# Patient Record
Sex: MALE | Race: WHITE | NOT HISPANIC OR LATINO | URBAN - METROPOLITAN AREA
[De-identification: names, ages, dates, MRNs, and addresses within clinical notes are randomized per-mention and may not be internally consistent; named-entity substitution may affect disease eponyms.]

---

## 2022-07-09 ENCOUNTER — TELEPHONE ENCOUNTER (OUTPATIENT)
Dept: URBAN - METROPOLITAN AREA CLINIC 121 | Facility: CLINIC | Age: 67
End: 2022-07-09

## 2022-07-09 RX ORDER — ASPIRIN 81 MG/1
TABLET, CHEWABLE ORAL
Refills: 0 | OUTPATIENT
Start: 2017-03-06 | End: 2017-11-17

## 2022-07-09 RX ORDER — ASPIRIN 81 MG/1
TABLET, CHEWABLE ORAL
Refills: 0 | OUTPATIENT
Start: 2016-10-25 | End: 2017-03-06

## 2022-07-09 RX ORDER — OMEGA-3S/DHA/EPA/FISH OIL 980-1400MG
CAPSULE,DELAYED RELEASE (ENTERIC COATED) ORAL
Refills: 0 | OUTPATIENT
Start: 2016-10-25 | End: 2017-03-06

## 2022-07-09 RX ORDER — SIMVASTATIN 20 MG/1
TABLET, FILM COATED ORAL
Refills: 0 | OUTPATIENT
Start: 2016-10-07 | End: 2017-03-06

## 2022-07-09 RX ORDER — OMEGA-3S/DHA/EPA/FISH OIL 980-1400MG
CAPSULE,DELAYED RELEASE (ENTERIC COATED) ORAL
Refills: 0 | OUTPATIENT
Start: 2017-03-06 | End: 2017-11-17

## 2022-07-09 RX ORDER — AMLODIPINE BESYLATE 10 MG/1
TABLET ORAL
Refills: 0 | OUTPATIENT
Start: 2016-10-07 | End: 2017-03-06

## 2022-07-10 ENCOUNTER — TELEPHONE ENCOUNTER (OUTPATIENT)
Dept: URBAN - METROPOLITAN AREA CLINIC 121 | Facility: CLINIC | Age: 67
End: 2022-07-10

## 2022-07-10 RX ORDER — ASPIRIN 81 MG/1
TABLET, CHEWABLE ORAL
Refills: 0 | Status: ACTIVE | COMMUNITY
Start: 2017-11-17

## 2022-07-10 RX ORDER — SIMVASTATIN 20 MG/1
TABLET, FILM COATED ORAL
Refills: 0 | Status: ACTIVE | COMMUNITY
Start: 2017-03-06

## 2022-07-10 RX ORDER — OMEGA-3S/DHA/EPA/FISH OIL 980-1400MG
CAPSULE,DELAYED RELEASE (ENTERIC COATED) ORAL
Refills: 0 | Status: ACTIVE | COMMUNITY
Start: 2017-11-17

## 2022-07-10 RX ORDER — AMLODIPINE BESYLATE 10 MG/1
TABLET ORAL
Refills: 0 | Status: ACTIVE | COMMUNITY
Start: 2017-03-06

## 2023-04-17 ENCOUNTER — P2P PATIENT RECORD (OUTPATIENT)
Age: 68
End: 2023-04-17

## 2023-04-18 ENCOUNTER — TELEPHONE ENCOUNTER (OUTPATIENT)
Dept: URBAN - METROPOLITAN AREA CLINIC 60 | Facility: CLINIC | Age: 68
End: 2023-04-18

## 2023-07-05 PROBLEM — 43783005: Status: ACTIVE | Noted: 2023-07-05

## 2023-07-05 PROBLEM — 197321007: Status: ACTIVE | Noted: 2023-07-05

## 2023-07-05 PROBLEM — 428283002: Status: ACTIVE | Noted: 2023-07-05

## 2023-07-06 ENCOUNTER — WEB ENCOUNTER (OUTPATIENT)
Dept: URBAN - METROPOLITAN AREA CLINIC 63 | Facility: CLINIC | Age: 68
End: 2023-07-06

## 2023-07-06 ENCOUNTER — OFFICE VISIT (OUTPATIENT)
Dept: URBAN - METROPOLITAN AREA CLINIC 63 | Facility: CLINIC | Age: 68
End: 2023-07-06
Payer: COMMERCIAL

## 2023-07-06 VITALS
BODY MASS INDEX: 33.66 KG/M2 | OXYGEN SATURATION: 98 % | WEIGHT: 254 LBS | HEIGHT: 73 IN | HEART RATE: 98 BPM | TEMPERATURE: 97.4 F | SYSTOLIC BLOOD PRESSURE: 180 MMHG | DIASTOLIC BLOOD PRESSURE: 100 MMHG

## 2023-07-06 DIAGNOSIS — Z78.9 MODERATE ALCOHOL CONSUMPTION: ICD-10-CM

## 2023-07-06 DIAGNOSIS — Z86.010 PERSONAL HISTORY OF COLONIC POLYPS: ICD-10-CM

## 2023-07-06 DIAGNOSIS — K76.0 FATTY LIVER: ICD-10-CM

## 2023-07-06 PROCEDURE — 99204 OFFICE O/P NEW MOD 45 MIN: CPT | Performed by: INTERNAL MEDICINE

## 2023-07-06 RX ORDER — LATANOPROST 50 UG/ML
SOLUTION OPHTHALMIC
Qty: 5 MILLILITER | Status: ACTIVE | COMMUNITY

## 2023-07-06 RX ORDER — OMEGA-3S/DHA/EPA/FISH OIL 980-1400MG
CAPSULE,DELAYED RELEASE (ENTERIC COATED) ORAL
Refills: 0 | Status: ACTIVE | COMMUNITY
Start: 2017-11-17

## 2023-07-06 RX ORDER — ASPIRIN 81 MG/1
TABLET, CHEWABLE ORAL
Refills: 0 | Status: ACTIVE | COMMUNITY
Start: 2017-11-17

## 2023-07-06 RX ORDER — CLOPIDOGREL 75 MG/1
1 TABLET TABLET, FILM COATED ORAL ONCE A DAY
Qty: 30 | Status: ACTIVE | COMMUNITY
Start: 2023-07-06

## 2023-07-06 RX ORDER — SIMVASTATIN 20 MG/1
TABLET, FILM COATED ORAL
Refills: 0 | Status: ACTIVE | COMMUNITY
Start: 2017-03-06

## 2023-07-06 RX ORDER — AMLODIPINE BESYLATE 10 MG/1
TABLET ORAL
Refills: 0 | Status: ACTIVE | COMMUNITY
Start: 2017-03-06

## 2023-07-06 NOTE — HPI-PREVIOUS PROCEDURES
Colonoscopy 2017: Good prep, normal terminal ileum, pancolonic diverticulosis, 5 mm hyperplastic rectal polyp, grade 2 hemorrhoids Colonoscopy 2011: Descending colon polyps-adenoma/hyperplastic (5 to 10 mm in size), hyperplastic rectal polyps.

## 2023-07-06 NOTE — HPI-PREVIOUS IMAGING
Ultrasound November 2017: Heterogeneous liver echotexture with mildly nodular contour, left hepatic lobe cyst, right hepatic lobe cyst, normal CBD, normal gallbladder normal spleen

## 2023-07-06 NOTE — HPI-TODAY'S VISIT:
Manpreet is a pleasant 68-year-old male with history of moderate alcohol use and fatty liver disease and evaluation for the need for a colonoscopy.  He is doing extremely well from a GI standpoint.  Reviewed his last colonoscopy report from 2017 when he had a small hyperplastic rectal polyp.  He may not require a colonoscopy till 2027. He is asymptomatic.  Reports regular bowel movements and denies any rectal bleeding.  Reports no reflux symptoms or dysphagia. Regarding his alcohol intake he reports he does not drink during the week but on Friday Saturday Sunday approximately has 4 alcoholic beverages per day. He exercises on a regular basis.-Rides his bike, swims and does yard work. He is currently off Plavix awaiting a prostate biopsy after his PSA came by elevated and his MRI pelvis noted suspicious nodules in his prostate..

## 2023-07-13 ENCOUNTER — LAB OUTSIDE AN ENCOUNTER (OUTPATIENT)
Dept: URBAN - METROPOLITAN AREA CLINIC 63 | Facility: CLINIC | Age: 68
End: 2023-07-13

## 2023-07-17 LAB
A/G RATIO: 2
ABSOLUTE BASOPHILS: 28
ABSOLUTE EOSINOPHILS: 169
ABSOLUTE LYMPHOCYTES: 2012
ABSOLUTE MONOCYTES: 606
ABSOLUTE NEUTROPHILS: 1885
AFP, SERUM, TUMOR MARKER: 3.5
ALBUMIN: 4.5
ALKALINE PHOSPHATASE: 44
ALT (SGPT): 46
AST (SGOT): 33
BASOPHILS: 0.6
BILIRUBIN, TOTAL: 2
BUN/CREATININE RATIO: (no result)
BUN: 18
CALCIUM: 9
CARBON DIOXIDE, TOTAL: 24
CHLORIDE: 102
CREATININE: 0.88
EGFR: 95
EOSINOPHILS: 3.6
GLOBULIN, TOTAL: 2.2
GLUCOSE: 94
HEMATOCRIT: 48
HEMOGLOBIN: 15.6
INR: 1
LYMPHOCYTES: 42.8
MCH: 30.2
MCHC: 32.5
MCV: 93
MONOCYTES: 12.9
MPV: 9.9
NEUTROPHILS: 40.1
PLATELET COUNT: 259
POTASSIUM: 4
PROTEIN, TOTAL: 6.7
PT: 10.4
RDW: 12.6
RED BLOOD CELL COUNT: 5.16
SODIUM: 135
WHITE BLOOD CELL COUNT: 4.7

## 2023-09-25 ENCOUNTER — APPOINTMENT (RX ONLY)
Dept: URBAN - METROPOLITAN AREA CLINIC 333 | Facility: CLINIC | Age: 68
Setting detail: DERMATOLOGY
End: 2023-09-25

## 2023-09-25 DIAGNOSIS — D22 MELANOCYTIC NEVI: ICD-10-CM

## 2023-09-25 DIAGNOSIS — L57.8 OTHER SKIN CHANGES DUE TO CHRONIC EXPOSURE TO NONIONIZING RADIATION: ICD-10-CM

## 2023-09-25 PROBLEM — D22.5 MELANOCYTIC NEVI OF TRUNK: Status: ACTIVE | Noted: 2023-09-25

## 2023-09-25 PROCEDURE — 99203 OFFICE O/P NEW LOW 30 MIN: CPT

## 2023-09-25 PROCEDURE — ? TREATMENT REGIMEN

## 2023-09-25 PROCEDURE — ? COUNSELING

## 2023-09-25 ASSESSMENT — LOCATION DETAILED DESCRIPTION DERM
LOCATION DETAILED: LEFT DISTAL DORSAL FOREARM
LOCATION DETAILED: RIGHT SUPERIOR MEDIAL UPPER BACK
LOCATION DETAILED: RIGHT DISTAL DORSAL FOREARM
LOCATION DETAILED: UPPER STERNUM
LOCATION DETAILED: RIGHT INFERIOR MEDIAL FOREHEAD

## 2023-09-25 ASSESSMENT — LOCATION SIMPLE DESCRIPTION DERM
LOCATION SIMPLE: RIGHT UPPER BACK
LOCATION SIMPLE: RIGHT FOREARM
LOCATION SIMPLE: RIGHT FOREHEAD
LOCATION SIMPLE: CHEST
LOCATION SIMPLE: LEFT FOREARM

## 2023-09-25 ASSESSMENT — LOCATION ZONE DERM
LOCATION ZONE: TRUNK
LOCATION ZONE: FACE
LOCATION ZONE: ARM

## 2023-10-26 ENCOUNTER — DASHBOARD ENCOUNTERS (OUTPATIENT)
Age: 68
End: 2023-10-26

## 2023-10-26 ENCOUNTER — OFFICE VISIT (OUTPATIENT)
Dept: URBAN - METROPOLITAN AREA CLINIC 63 | Facility: CLINIC | Age: 68
End: 2023-10-26
Payer: COMMERCIAL

## 2023-10-26 ENCOUNTER — OFFICE VISIT (OUTPATIENT)
Dept: URBAN - METROPOLITAN AREA CLINIC 63 | Facility: CLINIC | Age: 68
End: 2023-10-26

## 2023-10-26 VITALS
OXYGEN SATURATION: 98 % | DIASTOLIC BLOOD PRESSURE: 80 MMHG | WEIGHT: 254.2 LBS | HEIGHT: 73 IN | BODY MASS INDEX: 33.69 KG/M2 | SYSTOLIC BLOOD PRESSURE: 130 MMHG | TEMPERATURE: 97.3 F | HEART RATE: 78 BPM

## 2023-10-26 DIAGNOSIS — Z78.9 MODERATE ALCOHOL CONSUMPTION: ICD-10-CM

## 2023-10-26 DIAGNOSIS — Z86.010 PERSONAL HISTORY OF COLONIC POLYPS: ICD-10-CM

## 2023-10-26 DIAGNOSIS — K76.0 FATTY LIVER: ICD-10-CM

## 2023-10-26 PROCEDURE — 99214 OFFICE O/P EST MOD 30 MIN: CPT | Performed by: INTERNAL MEDICINE

## 2023-10-26 RX ORDER — CLOPIDOGREL 75 MG/1
1 TABLET TABLET, FILM COATED ORAL ONCE A DAY
Qty: 30 | Status: ACTIVE | COMMUNITY
Start: 2023-07-06

## 2023-10-26 RX ORDER — SIMVASTATIN 20 MG/1
TABLET, FILM COATED ORAL
Refills: 0 | Status: ACTIVE | COMMUNITY
Start: 2017-03-06

## 2023-10-26 RX ORDER — ASPIRIN 81 MG/1
TABLET, CHEWABLE ORAL
Refills: 0 | Status: ACTIVE | COMMUNITY
Start: 2017-11-17

## 2023-10-26 RX ORDER — AMLODIPINE BESYLATE 10 MG/1
TABLET ORAL
Refills: 0 | Status: ACTIVE | COMMUNITY
Start: 2017-03-06

## 2023-10-26 RX ORDER — LATANOPROST 50 UG/ML
SOLUTION OPHTHALMIC
Qty: 5 MILLILITER | Status: ACTIVE | COMMUNITY

## 2023-10-26 NOTE — HPI-TODAY'S VISIT:
Manpreet is a pleasant 68-year-old male with history of moderate alcohol use and fatty liver disease .  Manpreet is here today in follow-up. He seems to be doing well from a GI standpoint. He has cut down his alcohol intake. Drinks a glass of wine twice a week. He is back on Plavix. Exercises on a regular basis-walks and has joined a gym. Unfortunately his prostate biopsies came back showing malignancy and he has undergone multiple radiation treatments which he will finish in December. He will also get hormone shots. He wants to hold off on his colonoscopy till spring next year. He has had a CT abdomen as well as a bone scan for staging his prostate cancer and they came back unremarkable. Reports no change in bowel habits rectal bleeding or unintentional weight loss loss of appetite. Denies any reflux or dysphagia..  Reviewed his last colonoscopy report from 2017 when he had a small hyperplastic rectal polyp.  He may not require a colonoscopy till 2027.

## 2023-10-26 NOTE — HPI-PREVIOUS IMAGING
CAT scan abdomen with and without contrast July 2023: Large liver fatty infiltration, patent portal and hepatic veins. 1 cm left hepatic lobe cyst. 16 mm hemangioma in segment 7. Focal area of fat sparing in the inferior right lobe. No signs of cirrhosis. Normal biliary tree. Normal spleen. Normal gallbladder. Normal upper GI tract and small and large bowel. CAT scan abdomen with and without contrast July 2023: Mild stranding of the fat at the root of the mesentery without adenopathy..  Ultrasound November 2017: Heterogeneous liver echotexture with mildly nodular contour, left hepatic lobe cyst, right hepatic lobe cyst, normal CBD, normal gallbladder normal spleen

## 2024-08-13 ENCOUNTER — OFFICE VISIT (OUTPATIENT)
Dept: URBAN - METROPOLITAN AREA CLINIC 60 | Facility: CLINIC | Age: 69
End: 2024-08-13
Payer: COMMERCIAL

## 2024-08-13 VITALS
TEMPERATURE: 98.7 F | BODY MASS INDEX: 33.29 KG/M2 | RESPIRATION RATE: 12 BRPM | WEIGHT: 251.2 LBS | SYSTOLIC BLOOD PRESSURE: 152 MMHG | OXYGEN SATURATION: 98 % | DIASTOLIC BLOOD PRESSURE: 80 MMHG | HEIGHT: 73 IN | HEART RATE: 90 BPM

## 2024-08-13 DIAGNOSIS — K76.0 FATTY LIVER: ICD-10-CM

## 2024-08-13 DIAGNOSIS — Z78.9 MODERATE ALCOHOL CONSUMPTION: ICD-10-CM

## 2024-08-13 DIAGNOSIS — Z86.010 PERSONAL HISTORY OF COLONIC POLYPS: ICD-10-CM

## 2024-08-13 PROCEDURE — 99213 OFFICE O/P EST LOW 20 MIN: CPT | Performed by: INTERNAL MEDICINE

## 2024-08-13 RX ORDER — ONDANSETRON HYDROCHLORIDE 4 MG/1
1 TABLET TABLET, FILM COATED ORAL
Qty: 2 | Refills: 0 | OUTPATIENT
Start: 2024-08-13

## 2024-08-13 RX ORDER — SIMVASTATIN 20 MG/1
TAKE 1 TABLET BY MOUTH EVERY DAY TABLET, FILM COATED ORAL
Qty: 60 EACH | Refills: 0 | Status: ACTIVE | COMMUNITY

## 2024-08-13 RX ORDER — CLOPIDOGREL BISULFATE 75 MG/1
TABLET, FILM COATED ORAL
Qty: 30 TABLET | Status: ACTIVE | COMMUNITY

## 2024-08-13 RX ORDER — LATANOPROST 50 UG/ML
INSTILL 1 DROP IN RIGHT EYE EVERY EVENING SOLUTION OPHTHALMIC
Qty: 2.5 MILLILITER | Refills: 1 | Status: ACTIVE | COMMUNITY

## 2024-08-13 RX ORDER — POLYETHYLENE GLYCOL 3350, SODIUM CHLORIDE, SODIUM BICARBONATE, POTASSIUM CHLORIDE 420; 11.2; 5.72; 1.48 G/4L; G/4L; G/4L; G/4L
AS DIRECTED POWDER, FOR SOLUTION ORAL ONCE
Qty: 4000 | Refills: 0 | OUTPATIENT
Start: 2024-08-13 | End: 2024-08-14

## 2024-08-13 RX ORDER — AMLODIPINE BESYLATE 10 MG/1
TAKE 1 TABLET BY MOUTH EVERY DAY TABLET ORAL
Qty: 60 EACH | Refills: 0 | Status: ACTIVE | COMMUNITY

## 2024-08-13 NOTE — HPI-PREVIOUS LABS
Labs April 2024: Normal renal function, normal electrolytes, serum albumin 4.4, normal liver enzymes, total bilirubin 1.5, Triglycerides 198 (H), normal rest of the lipid panel, WBC 3.01, hemoglobin 14.4 g, MCV 91, platelets 226  \ Serological work-up negative Previous history of elevated serum iron with normal iron saturation.

## 2024-08-13 NOTE — PHYSICAL EXAM GASTROINTESTINAL
soft, nontender, nondistended , no masses palpable , normal bowel sounds , Liver margin felt 3 cm below rcm

## 2024-08-13 NOTE — PHYSICAL EXAM EYES:
Conjuntivae and eyelids appear normal , Sclerae : White without injection , no scalp lesions Rapid SARS-COV-2 by PCR Not Detected / Detected / Presumptive Positive / Inhibitors present Detected     Parent informed that pts COVID testing was positive.  They should expect a phone call from the Public [facility].    They need to remain on strict home self isolation. It would be appropriate to return to work/school    *once they have been fever free for least 24 hours without the use of fever reducing medications,    *improvement of their symptoms   *and at least 5 days have passed since the symptoms began.      Unless otherwise directed by [facility].     For any worsening symptoms, they need to be evaluated in the Emergency Department, not the Urgent Care

## 2024-08-13 NOTE — HPI-TODAY'S VISIT:
Manpreet is here today in follow-up. He completed 45 sessions of radiation for his prostate cancer last fall. He is on hormonal supplementation. He reports that he drinks 2 beers and 2 glasses of wine over the weekends on each day. He denies any other symptoms of reflux dysphagia early satiety bloating abdominal pain or nausea. Denies any constipation diarrhea rectal bleeding or melena. He has a  and works out 3 days a week. He is on Plavix for his total global amnesia that is being prescribed by his primary care doctor Dr. Flores. He is also on 81 mg aspirin. He is due for his colonoscopy. He has a history of fatty liver disease and his risk factors being overweight, hypertriglyceridemia and mild to moderate alcohol use  Reviewed his last colonoscopy report from 2017 when he had a small hyperplastic rectal polyp.

## 2024-08-13 NOTE — HPI-PREVIOUS IMAGING
CAT scan abdomen with and without contrast July 2023: Large liver fatty infiltration, patent portal and hepatic veins. 1 cm left hepatic lobe cyst. 16 mm hemangioma in segment 7. Focal area of fat sparing in the inferior right lobe. No signs of cirrhosis. Normal biliary tree. Normal spleen. Normal gallbladder. Normal upper GI tract and small and large bowel. Mild stranding of the fat at the root of the mesentery without adenopathy..  Ultrasound November 2017: Heterogeneous liver echotexture with mildly nodular contour, left hepatic lobe cyst, right hepatic lobe cyst, normal CBD, normal gallbladder normal spleen

## 2024-08-20 ENCOUNTER — LAB OUTSIDE AN ENCOUNTER (OUTPATIENT)
Dept: URBAN - METROPOLITAN AREA CLINIC 60 | Facility: CLINIC | Age: 69
End: 2024-08-20

## 2024-10-02 ENCOUNTER — TELEPHONE ENCOUNTER (OUTPATIENT)
Dept: URBAN - METROPOLITAN AREA CLINIC 60 | Facility: CLINIC | Age: 69
End: 2024-10-02

## 2024-10-14 ENCOUNTER — TELEPHONE ENCOUNTER (OUTPATIENT)
Dept: URBAN - METROPOLITAN AREA SURGERY CENTER 4 | Facility: SURGERY CENTER | Age: 69
End: 2024-10-14

## 2025-01-21 ENCOUNTER — CLAIMS CREATED FROM THE CLAIM WINDOW (OUTPATIENT)
Dept: URBAN - METROPOLITAN AREA SURGERY CENTER 4 | Facility: SURGERY CENTER | Age: 70
End: 2025-01-21
Payer: COMMERCIAL

## 2025-01-21 DIAGNOSIS — K64.1 SECOND DEGREE HEMORRHOIDS: ICD-10-CM

## 2025-01-21 DIAGNOSIS — K62.7 RADIATION PROCTITIS: ICD-10-CM

## 2025-01-21 DIAGNOSIS — K63.5 BENIGN COLON POLYP: ICD-10-CM

## 2025-01-21 DIAGNOSIS — K62.1 RECTAL POLYP: ICD-10-CM

## 2025-01-21 DIAGNOSIS — Z86.0100 PERSONAL HISTORY OF COLON POLYPS, UNSPECIFIED: ICD-10-CM

## 2025-01-21 DIAGNOSIS — K63.5 POLYP OF DESCENDING COLON, UNSPECIFIED TYPE: ICD-10-CM

## 2025-01-21 DIAGNOSIS — K63.89 OTHER SPECIFIED DISEASES OF INTESTINE: ICD-10-CM

## 2025-01-21 DIAGNOSIS — K57.30 DIVERTICULOSIS OF LARGE INTESTINE WITHOUT PERFORATION OR ABSCESS WITHOUT BLEEDING: ICD-10-CM

## 2025-01-21 DIAGNOSIS — Z09 ENCOUNTER FOR FOLLOW-UP EXAMINATION AFTER COMPLETED TREATMENT FOR CONDITIONS OTHER THAN MALIGNANT NEOPLASM: ICD-10-CM
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
## 2025-01-21 PROCEDURE — 00812 ANES LWR INTST SCR COLSC: CPT | Performed by: NURSE ANESTHETIST, CERTIFIED REGISTERED

## 2025-01-21 PROCEDURE — 45385 COLONOSCOPY W/LESION REMOVAL: CPT | Performed by: INTERNAL MEDICINE

## 2025-01-21 PROCEDURE — 45380 COLONOSCOPY AND BIOPSY: CPT | Performed by: INTERNAL MEDICINE

## 2025-01-21 RX ORDER — LATANOPROST 50 UG/ML
INSTILL 1 DROP IN RIGHT EYE EVERY EVENING SOLUTION OPHTHALMIC
Qty: 2.5 MILLILITER | Refills: 1 | Status: ACTIVE | COMMUNITY

## 2025-01-21 RX ORDER — AMLODIPINE BESYLATE 10 MG/1
TAKE 1 TABLET BY MOUTH EVERY DAY TABLET ORAL
Qty: 60 EACH | Refills: 0 | Status: ACTIVE | COMMUNITY

## 2025-01-21 RX ORDER — SIMVASTATIN 20 MG/1
TAKE 1 TABLET BY MOUTH EVERY DAY TABLET, FILM COATED ORAL
Qty: 60 EACH | Refills: 0 | Status: ACTIVE | COMMUNITY

## 2025-01-21 RX ORDER — CLOPIDOGREL BISULFATE 75 MG/1
TABLET, FILM COATED ORAL
Qty: 30 TABLET | Status: ACTIVE | COMMUNITY

## 2025-01-21 RX ORDER — ONDANSETRON HYDROCHLORIDE 4 MG/1
1 TABLET TABLET, FILM COATED ORAL
Qty: 2 | Refills: 0 | Status: ACTIVE | COMMUNITY
Start: 2024-08-13

## 2025-07-21 ENCOUNTER — APPOINTMENT (OUTPATIENT)
Dept: URBAN - METROPOLITAN AREA CLINIC 333 | Facility: CLINIC | Age: 70
Setting detail: DERMATOLOGY
End: 2025-07-21

## 2025-07-21 DIAGNOSIS — L82.1 OTHER SEBORRHEIC KERATOSIS: ICD-10-CM

## 2025-07-21 DIAGNOSIS — L85.3 XEROSIS CUTIS: ICD-10-CM

## 2025-07-21 DIAGNOSIS — D22 MELANOCYTIC NEVI: ICD-10-CM

## 2025-07-21 DIAGNOSIS — L57.8 OTHER SKIN CHANGES DUE TO CHRONIC EXPOSURE TO NONIONIZING RADIATION: ICD-10-CM

## 2025-07-21 DIAGNOSIS — L81.4 OTHER MELANIN HYPERPIGMENTATION: ICD-10-CM

## 2025-07-21 PROBLEM — D22.5 MELANOCYTIC NEVI OF TRUNK: Status: ACTIVE | Noted: 2025-07-21

## 2025-07-21 PROCEDURE — ? FULL BODY SKIN EXAM

## 2025-07-21 PROCEDURE — ? COUNSELING

## 2025-07-21 PROCEDURE — ? TREATMENT REGIMEN

## 2025-07-21 PROCEDURE — ? OTC TREATMENT REGIMEN

## 2025-07-21 ASSESSMENT — LOCATION SIMPLE DESCRIPTION DERM
LOCATION SIMPLE: LEFT PRETIBIAL REGION
LOCATION SIMPLE: UPPER BACK
LOCATION SIMPLE: RIGHT PRETIBIAL REGION
LOCATION SIMPLE: RIGHT UPPER BACK

## 2025-07-21 ASSESSMENT — LOCATION DETAILED DESCRIPTION DERM
LOCATION DETAILED: RIGHT PROXIMAL PRETIBIAL REGION
LOCATION DETAILED: LEFT PROXIMAL PRETIBIAL REGION
LOCATION DETAILED: INFERIOR THORACIC SPINE
LOCATION DETAILED: RIGHT SUPERIOR MEDIAL UPPER BACK

## 2025-07-21 ASSESSMENT — LOCATION ZONE DERM
LOCATION ZONE: TRUNK
LOCATION ZONE: LEG